# Patient Record
Sex: FEMALE | Race: BLACK OR AFRICAN AMERICAN | Employment: FULL TIME | URBAN - NONMETROPOLITAN AREA
[De-identification: names, ages, dates, MRNs, and addresses within clinical notes are randomized per-mention and may not be internally consistent; named-entity substitution may affect disease eponyms.]

---

## 2018-03-20 ENCOUNTER — APPOINTMENT (OUTPATIENT)
Dept: OCCUPATIONAL MEDICINE | Facility: OTHER | Age: 33
End: 2018-03-20

## 2018-03-20 PROCEDURE — 99000 SPECIMEN HANDLING OFFICE-LAB: CPT

## 2018-11-27 NOTE — PROGRESS NOTES
SUBJECTIVE:   Yodit Bronson is a 33 year old female who presents to clinic today for the following health issues:    New Patient/Transfer of Care.    From [place] - had very limited medical care there.  Was living in [place] -  is there.  Residing in [place] now.   Work for [employer].    Conceiving Issue    Duration: Try to conceive since 2016    Description (location/character/radiation): Not on birth control, never has been on.     Intensity:  N/A    Accompanying signs and symptoms: None     History (similar episodes/previous evaluation): No previous pregnancies     Precipitating or alleviating factors: None    Therapies tried and outcome: Tracking menstrual cycle. LMP 11/25/18.     Does have hx of bad stomach cramps when on cycle - uses acetaminophen 500mg which helps.       has children from prior relationship.    Menarche as teen.    Regular menses.  Monthly, less than 1 week, 2nd day heavy.  No OTC ovulation kits.    7 siblings.  Sibling also have children.  1 sister passed - kidney and lung issues - age 42.    Dad with HTN.    Problem list and histories reviewed & adjusted, as indicated.  Additional history: as documented    Current Outpatient Prescriptions   Medication     ACETAMINOPHEN PO     No current facility-administered medications for this visit.        There is no problem list on file for this patient.    History reviewed. No pertinent surgical history.    Social History   Substance Use Topics     Smoking status: Never Smoker     Smokeless tobacco: Never Used     Alcohol use No     Family History   Problem Relation Age of Onset     KIDNEY DISEASE Sister            Reviewed and updated as needed this visit by clinical staff  Tobacco  Allergies  Meds  Med Hx  Surg Hx  Fam Hx  Soc Hx      Reviewed and updated as needed this visit by Provider         ROS:  Constitutional, HEENT, cardiovascular, pulmonary, gi and gu systems are negative, except as otherwise noted.    OBJECTIVE:      /68 (BP Location: Left arm, Patient Position: Chair, Cuff Size: Adult Regular)  Pulse 89  Temp 97.7  F (36.5  C) (Tympanic)  Wt 159 lb 9.6 oz (72.4 kg)  SpO2 99%  There is no height or weight on file to calculate BMI.  GENERAL: healthy, alert and no distress  NECK: no adenopathy, no asymmetry, masses, or scars and thyroid normal to palpation  RESP: lungs clear to auscultation - no rales, rhonchi or wheezes  CV: regular rate and rhythm, normal S1 S2, no S3 or S4, no murmur, click or rub, no peripheral edema and peripheral pulses strong  ABDOMEN: soft, nontender, no hepatosplenomegaly, no masses and bowel sounds normal  MS: no gross musculoskeletal defects noted, no edema  PSYCH: mentation appears normal, affect normal/bright    Diagnostic Test Results:  none     ASSESSMENT/PLAN:   (N97.9) Female fertility problems  (primary encounter diagnosis)  Comment: actively trying for years;  has children from prior relationship; referral to GYN for fertility evaluation  Plan: OB/GYN REFERRAL          Patient Instructions   Flu shot given.  Referral to OB/GYN for fertility consultation.              Argenis Andrews MD  [facility] [place]

## 2018-11-30 ENCOUNTER — OFFICE VISIT (OUTPATIENT)
Dept: FAMILY MEDICINE | Facility: OTHER | Age: 33
End: 2018-11-30
Attending: FAMILY MEDICINE
Payer: COMMERCIAL

## 2018-11-30 VITALS
TEMPERATURE: 97.7 F | WEIGHT: 159.6 LBS | HEART RATE: 89 BPM | OXYGEN SATURATION: 99 % | DIASTOLIC BLOOD PRESSURE: 68 MMHG | SYSTOLIC BLOOD PRESSURE: 118 MMHG

## 2018-11-30 DIAGNOSIS — N97.9 FEMALE FERTILITY PROBLEMS: Primary | ICD-10-CM

## 2018-11-30 DIAGNOSIS — Z23 NEED FOR PROPHYLACTIC VACCINATION AND INOCULATION AGAINST INFLUENZA: ICD-10-CM

## 2018-11-30 PROCEDURE — 90471 IMMUNIZATION ADMIN: CPT | Performed by: FAMILY MEDICINE

## 2018-11-30 PROCEDURE — 99203 OFFICE O/P NEW LOW 30 MIN: CPT | Mod: 25 | Performed by: FAMILY MEDICINE

## 2018-11-30 PROCEDURE — 90686 IIV4 VACC NO PRSV 0.5 ML IM: CPT | Performed by: FAMILY MEDICINE

## 2018-11-30 ASSESSMENT — ANXIETY QUESTIONNAIRES
4. TROUBLE RELAXING: NOT AT ALL
2. NOT BEING ABLE TO STOP OR CONTROL WORRYING: NOT AT ALL
6. BECOMING EASILY ANNOYED OR IRRITABLE: NOT AT ALL
3. WORRYING TOO MUCH ABOUT DIFFERENT THINGS: NOT AT ALL
1. FEELING NERVOUS, ANXIOUS, OR ON EDGE: NOT AT ALL
7. FEELING AFRAID AS IF SOMETHING AWFUL MIGHT HAPPEN: NOT AT ALL
GAD7 TOTAL SCORE: 0
5. BEING SO RESTLESS THAT IT IS HARD TO SIT STILL: NOT AT ALL

## 2018-11-30 ASSESSMENT — PAIN SCALES - GENERAL: PAINLEVEL: NO PAIN (0)

## 2018-11-30 ASSESSMENT — PATIENT HEALTH QUESTIONNAIRE - PHQ9: SUM OF ALL RESPONSES TO PHQ QUESTIONS 1-9: 0

## 2018-11-30 NOTE — MR AVS SNAPSHOT
After Visit Summary   11/30/2018    Yodit Bronson    MRN: [id number]           Patient Information     Date Of Birth          1985        Visit Information        Provider Department      11/30/2018 1:45 PM Argenis Clemente MD [facility]        Today's Diagnoses     Female fertility problems    -  1      Care Instructions    Flu shot given.  Referral to OB/GYN for fertility consultation.            Follow-ups after your visit        Additional Services     OB/GYN REFERRAL       Your provider has referred you to:  [facility] [phone]  [email or web address]    Please be aware that coverage of these services is subject to the terms and limitations of your health insurance plan.  Call member services at your health plan with any benefit or coverage questions.      Please bring the following with you to your appointment:    (1) Any X-Rays, CTs or MRIs which have been performed.  Contact the facility where they were done to arrange for  prior to your scheduled appointment.   (2) List of current medications   (3) This referral request   (4) Any documents/labs given to you for this referral                  Who to contact     If you have questions or need follow up information about today's clinic visit or your schedule please contact [facility] directly at [phone].  Normal or non-critical lab and imaging results will be communicated to you by MyChart, letter or phone within 4 business days after the clinic has received the results. If you do not hear from us within 7 days, please contact the clinic through MyChart or phone. If you have a critical or abnormal lab result, we will notify you by phone as soon as possible.  Submit refill requests through [employer] or call your pharmacy and they will forward the refill request to us. Please allow 3 business days for your refill to be  "completed.          Additional Information About Your Visit        ZENTICKETharVidBid Information     [employer] lets you send messages to your doctor, view your test results, renew your prescriptions, schedule appointments and more. To sign up, go to [email or web address] . Click on \"Log in\" on the left side of the screen, which will take you to the Welcome page. Then click on \"Sign up Now\" on the right side of the page.     You will be asked to enter the access code listed below, as well as some personal information. Please follow the directions to create your username and password.     Your access code is: [email or web address]  Expires: 2019  2:05 PM     Your access code will  in 90 days. If you need help or a new code, please call your [place] clinic or [phone].        Care EveryWhere ID     This is your Care EveryWhere ID. This could be used by other organizations to access your [place] medical records  [id number]        Your Vitals Were     Pulse Temperature Pulse Oximetry             89 97.7  F (36.5  C) (Tympanic) 99%          Blood Pressure from Last 3 Encounters:   18 118/68    Weight from Last 3 Encounters:   18 159 lb 9.6 oz (72.4 kg)              We Performed the Following     OB/GYN REFERRAL        Primary Care Provider    None Specified       No primary provider on file.        Equal Access to Services     [facility]: Hadii aad ku hadasho Soomaali, waaxda luqadaha, qaybta kaalmada adeegyada, aram weldon . So [facility] [phone].    ATENCIÓN: Si habla español, tiene a he disposición servicios gratuitos de asistencia lingüística. Llame al [phone].    We comply with applicable federal civil rights laws and [place] laws. We do not discriminate on the basis of race, color, national origin, age, disability, sex, sexual orientation, or gender identity.            Thank you!     Thank you for choosing [facility]  for your care. Our goal " is always to provide you with excellent care. Hearing back from our patients is one way we can continue to improve our services. Please take a few minutes to complete the written survey that you may receive in the mail after your visit with us. Thank you!             Your Updated Medication List - Protect others around you: Learn how to safely use, store and throw away your medicines at www.disposemymeds.org.          This list is accurate as of 11/30/18  2:05 PM.  Always use your most recent med list.                   Brand Name Dispense Instructions for use Diagnosis    ACETAMINOPHEN PO      Take 500 mg by mouth daily as needed for pain

## 2018-11-30 NOTE — NURSING NOTE
Chief Complaint   Patient presents with     Establish Care       Initial /68 (BP Location: Left arm, Patient Position: Chair, Cuff Size: Adult Regular)  Pulse 89  Temp 97.7  F (36.5  C) (Tympanic)  Wt 159 lb 9.6 oz (72.4 kg)  SpO2 99% There is no height or weight on file to calculate BMI.  Medication Reconciliation: complete    Taylor Sandoval, [place]

## 2018-11-30 NOTE — PROGRESS NOTES
Injectable Influenza Immunization Documentation    1.  Is the person to be vaccinated sick today?   No    2. Does the person to be vaccinated have an allergy to a component   of the vaccine?   No  Egg Allergy Algorithm Link    3. Has the person to be vaccinated ever had a serious reaction   to influenza vaccine in the past?   No    4. Has the person to be vaccinated ever had Guillain-Barré syndrome?   No    Form completed by Taylor Sandoval LPN    Prior to injection verified patient identity using patient's name and date of birth.    Taylor Sandoval LPN

## 2018-12-01 ASSESSMENT — ANXIETY QUESTIONNAIRES: GAD7 TOTAL SCORE: 0

## 2018-12-07 ENCOUNTER — OFFICE VISIT (OUTPATIENT)
Dept: OBGYN | Facility: OTHER | Age: 33
End: 2018-12-07
Attending: FAMILY MEDICINE
Payer: COMMERCIAL

## 2018-12-07 VITALS
WEIGHT: 162 LBS | HEART RATE: 88 BPM | HEIGHT: 66 IN | DIASTOLIC BLOOD PRESSURE: 62 MMHG | BODY MASS INDEX: 26.03 KG/M2 | SYSTOLIC BLOOD PRESSURE: 100 MMHG

## 2018-12-07 DIAGNOSIS — N97.9 FEMALE FERTILITY PROBLEMS: ICD-10-CM

## 2018-12-07 LAB
T4 FREE SERPL-MCNC: 0.9 NG/DL (ref 0.76–1.46)
TSH SERPL DL<=0.005 MIU/L-ACNC: 0.37 MU/L (ref 0.4–4)

## 2018-12-07 PROCEDURE — 84443 ASSAY THYROID STIM HORMONE: CPT | Performed by: OBSTETRICS & GYNECOLOGY

## 2018-12-07 PROCEDURE — 83001 ASSAY OF GONADOTROPIN (FSH): CPT | Performed by: OBSTETRICS & GYNECOLOGY

## 2018-12-07 PROCEDURE — 2894A VOIDCORRECT: CPT | Performed by: OBSTETRICS & GYNECOLOGY

## 2018-12-07 PROCEDURE — 84439 ASSAY OF FREE THYROXINE: CPT | Performed by: OBSTETRICS & GYNECOLOGY

## 2018-12-07 PROCEDURE — 99213 OFFICE O/P EST LOW 20 MIN: CPT | Performed by: OBSTETRICS & GYNECOLOGY

## 2018-12-07 PROCEDURE — 36415 COLL VENOUS BLD VENIPUNCTURE: CPT | Performed by: OBSTETRICS & GYNECOLOGY

## 2018-12-07 ASSESSMENT — PAIN SCALES - GENERAL: PAINLEVEL: NO PAIN (0)

## 2018-12-07 NOTE — NURSING NOTE
"Chief Complaint   Patient presents with     Consult     Consult from Dr Clemente for infertility       Initial /62 (BP Location: Left arm, Patient Position: Sitting, Cuff Size: Adult Regular)  Pulse 88  Ht 5' 6\" (1.676 m)  Wt 162 lb (73.5 kg)  LMP 11/30/2018 (Exact Date)  Breastfeeding? No  BMI 26.15 kg/m2 Estimated body mass index is 26.15 kg/(m^2) as calculated from the following:    Height as of this encounter: 5' 6\" (1.676 m).    Weight as of this encounter: 162 lb (73.5 kg).  Medication Reconciliation: complete    MADHAV PRICE LPN    "

## 2018-12-07 NOTE — PROGRESS NOTES
"Yodit Bronson is a 33 year old  who desires pregnancy. She has been trying since her marriage 2 years ago, unfortunately due to her work she and her  are together only 1-2 days per month. She states that her periods are very regular at 28-30 days. Her  has a 5yo. Through a previous marriage. She denies any h/o pelvic infections/STD's. They have not timed intercourse    Past Medical History:   Diagnosis Date     NO ACTIVE PROBLEMS      Past Surgical History:   Procedure Laterality Date     NO HISTORY OF SURGERY        Not on File    EXAM  /62 (BP Location: Left arm, Patient Position: Sitting, Cuff Size: Adult Regular)  Pulse 88  Ht 5' 6\" (1.676 m)  Wt 162 lb (73.5 kg)  LMP 2018 (Exact Date)  Breastfeeding? No  BMI 26.15 kg/m2  Gen - NAD, well nourished  Extremities - no edema      A/P Desire for pregnancy     1. Discussed timing intercourse and pt was given ovulation calendar.   2. Discussed possible use of an ovulation test kit.    3. Pt would seem to be ovulatory due to regularity of menses but will check an FSH and TSH   4. HSG may be of value since eggs and sperm would seem to be present based on history. Pt will consider and f/u PRN    ELY CHRISTENSEN MD  This visit included 20 minutes of face to face consultation on the above topics  "

## 2018-12-07 NOTE — MR AVS SNAPSHOT
"              After Visit Summary   2018    Yodit Bronson    MRN: [id number]           Patient Information     Date Of Birth          1985        Visit Information        Provider Department      2018 1:30 PM Ryder Armstrong MD [facility]        Today's Diagnoses     Female fertility problems           Follow-ups after your visit        Who to contact     If you have questions or need follow up information about today's clinic visit or your schedule please contact [facility] directly at [phone].  Normal or non-critical lab and imaging results will be communicated to you by [employer], letter or phone within 4 business days after the clinic has received the results. If you do not hear from us within 7 days, please contact the clinic through [employer] or phone. If you have a critical or abnormal lab result, we will notify you by phone as soon as possible.  Submit refill requests through [employer] or call your pharmacy and they will forward the refill request to us. Please allow 3 business days for your refill to be completed.          Additional Information About Your Visit        MyChart Information     [employer] lets you send messages to your doctor, view your test results, renew your prescriptions, schedule appointments and more. To sign up, go to [email or web address] . Click on \"Log in\" on the left side of the screen, which will take you to the Welcome page. Then click on \"Sign up Now\" on the right side of the page.     You will be asked to enter the access code listed below, as well as some personal information. Please follow the directions to create your username and password.     Your access code is: [email or web address]  Expires: 2019  2:05 PM     Your access code will  in 90 days. If you need help or a new code, please call your [facility] or [phone].        Care EveryWhere ID     This is your Care EveryWhere ID. This could " "be used by other organizations to access your [place] medical records  [id number]        Your Vitals Were     Pulse Height Last Period Breastfeeding? BMI (Body Mass Index)       88 5' 6\" (1.676 m) 11/30/2018 (Exact Date) No 26.15 kg/m2        Blood Pressure from Last 3 Encounters:   12/07/18 100/62   11/30/18 118/68    Weight from Last 3 Encounters:   12/07/18 162 lb (73.5 kg)   11/30/18 159 lb 9.6 oz (72.4 kg)              We Performed the Following     Follicle stimulating hormone     T4 free     T4 free     TSH with free T4 reflex        Primary Care Provider Office Phone # Fax #    Argenis Clemente [phone] [address] [place] [place] [address]        Equal Access to Services     [facility] [place] : Hadii conrado capps hadasho Sojose l, waaxda luqadaha, qaybta kaalmada adeegyada, aram weldon . So [facility] [phone].    ATENCIÓN: Si habla español, tiene a he disposición servicios gratuitos de asistencia lingüística. Llame al [phone].    We comply with applicable federal civil rights laws and [place] laws. We do not discriminate on the basis of race, color, national origin, age, disability, sex, sexual orientation, or gender identity.            Thank you!     Thank you for choosing [facility]  for your care. Our goal is always to provide you with excellent care. Hearing back from our patients is one way we can continue to improve our services. Please take a few minutes to complete the written survey that you may receive in the mail after your visit with us. Thank you!             Your Updated Medication List - Protect others around you: Learn how to safely use, store and throw away your medicines at www.disposemymeds.org.          This list is accurate as of 12/7/18  2:56 PM.  Always use your most recent med list.                   Brand Name Dispense Instructions for use Diagnosis    ACETAMINOPHEN PO      Take 500 mg by mouth daily as " needed for pain

## 2018-12-10 LAB — FSH SERPL-ACNC: 9.4 IU/L

## 2019-02-04 NOTE — PROGRESS NOTES
SUBJECTIVE:   Yodit Bronson is a 33 year old female who presents to clinic today for the following health issues:    Vaginal Bleeding (Dysmenorrhea)      Onset: January 6th, 2019 - first noticed after intercourse.     Description:  Duration of bleeding episodes: Ongoing since 1/6/19 - first time this had ever occurred   Frequency between periods:  30 days   Describe bleeding/flow:   Clots: no  Number of pads/hour: 1 per day   Cramping: mild - once     Intensity:  mild    Accompanying signs and symptoms: Ongoing bleeding since intercourse on 1/6/19 - has been wearing pad since as has been bleeding. Varies from excessive blood to just spotting.    History (similar episodes/previous evaluation): None    Precipitating or alleviating factors: None    Therapies tried and outcome: None    G0    Did see OBGYN 12/2018 for fertility issues    No pain or cramping    No discharge or itching    LMP 1/14/19 - heavy    Unsure of last pap          Problem list and histories reviewed & adjusted, as indicated.  Additional history: as documented    Current Outpatient Medications   Medication     ACETAMINOPHEN PO     No current facility-administered medications for this visit.        There is no problem list on file for this patient.    Past Surgical History:   Procedure Laterality Date     NO HISTORY OF SURGERY         Social History     Tobacco Use     Smoking status: Never Smoker     Smokeless tobacco: Never Used   Substance Use Topics     Alcohol use: No     Family History   Problem Relation Age of Onset     Kidney Disease Sister            Reviewed and updated as needed this visit by clinical staff       Reviewed and updated as needed this visit by Provider         ROS:  CONSTITUTIONAL:NEGATIVE for fever, chills, change in weight  INTEGUMENTARY/SKIN: NEGATIVE for worrisome rashes  RESP:NEGATIVE for significant cough or SOB  CV: NEGATIVE for chest pain, palpitations or peripheral edema  GI: NEGATIVE for nausea, abdominal  pain, heartburn, or change in bowel habits  : as above    OBJECTIVE:     /68 (BP Location: Right arm, Patient Position: Chair, Cuff Size: Adult Regular)   Pulse 89   Temp 97.7  F (36.5  C) (Tympanic)   Wt 75.3 kg (166 lb)   SpO2 100%   BMI 26.79 kg/m    Body mass index is 26.79 kg/m .  GENERAL: healthy, alert and no distress  NECK: no adenopathy, no asymmetry, masses, or scars and thyroid normal to palpation  RESP: lungs clear to auscultation - no rales, rhonchi or wheezes  CV: regular rate and rhythm, normal S1 S2, no S3 or S4, no murmur, click or rub, no peripheral edema and peripheral pulses strong  ABDOMEN: soft, nontender, no hepatosplenomegaly, no masses and bowel sounds normal   (female): normal female external genitalia, normal urethral meatus , vaginal mucosa pink, moist, well rugated and cervix with large polypoid, friable tissue protruding from cervix  MS: no gross musculoskeletal defects noted, no edema  PSYCH: mentation appears normal, affect normal/bright    Diagnostic Test Results:  pending    ASSESSMENT/PLAN:   (N92.1) Menorrhagia with irregular cycle  (primary encounter diagnosis)  Plan: TSH with free T4 reflex, CBC with platelets and        differential, HCG Qual, Urine - CSC,  [place],         [place]  ([id number]), US Pelvic Complete with         Transvaginal, A pap thin layer diagnostic with         HPV (select HPV order below), HPV High Risk         Types DNA Cervical, OB/GYN REFERRAL, Wet prep      (R79.89) Abnormal TSH  Comment: suppressed, with normal T4 on last check  Plan: TSH with free T4 reflex            (N84.1) Cervical polyp  Comment: polyp vs other tissue?  Plan: A pap thin layer diagnostic with HPV (select         HPV order below), HPV High Risk Types DNA         Cervical, OB/GYN REFERRAL            (N93.0) Postcoital bleeding  Plan: A pap thin layer diagnostic with HPV (select         HPV order below), HPV High Risk Types DNA         Cervical, OB/GYN REFERRAL, Wet  prep    Labs today - thyroid, bacteria/yeast, blood counts.  Will call with results.  Pap smear for cervical cancer screening done as well.  Pelvic ultrasound and gynecology referral placed.  They will call to schedule.  Continue to wear pad/liner.  Avoid intercourse if causing further pain/bleeding.      Argenis Andrews MD  [facility] - [place]

## 2019-02-06 ENCOUNTER — OFFICE VISIT (OUTPATIENT)
Dept: FAMILY MEDICINE | Facility: OTHER | Age: 34
End: 2019-02-06
Attending: FAMILY MEDICINE
Payer: COMMERCIAL

## 2019-02-06 VITALS
OXYGEN SATURATION: 100 % | TEMPERATURE: 97.7 F | HEART RATE: 89 BPM | SYSTOLIC BLOOD PRESSURE: 110 MMHG | DIASTOLIC BLOOD PRESSURE: 68 MMHG | BODY MASS INDEX: 26.79 KG/M2 | WEIGHT: 166 LBS

## 2019-02-06 DIAGNOSIS — N92.1 MENORRHAGIA WITH IRREGULAR CYCLE: Primary | ICD-10-CM

## 2019-02-06 DIAGNOSIS — R79.89 ABNORMAL TSH: ICD-10-CM

## 2019-02-06 DIAGNOSIS — N84.1 CERVICAL POLYP: ICD-10-CM

## 2019-02-06 DIAGNOSIS — N76.0 BV (BACTERIAL VAGINOSIS): ICD-10-CM

## 2019-02-06 DIAGNOSIS — N93.0 POSTCOITAL BLEEDING: ICD-10-CM

## 2019-02-06 DIAGNOSIS — B96.89 BV (BACTERIAL VAGINOSIS): ICD-10-CM

## 2019-02-06 LAB
BASOPHILS # BLD AUTO: 0 10E9/L (ref 0–0.2)
BASOPHILS NFR BLD AUTO: 0.2 %
DIFFERENTIAL METHOD BLD: NORMAL
EOSINOPHIL # BLD AUTO: 0.1 10E9/L (ref 0–0.7)
EOSINOPHIL NFR BLD AUTO: 0.8 %
ERYTHROCYTE [DISTWIDTH] IN BLOOD BY AUTOMATED COUNT: 13.3 % (ref 10–15)
HCG UR QL: NEGATIVE
HCT VFR BLD AUTO: 36.9 % (ref 35–47)
HGB BLD-MCNC: 12.5 G/DL (ref 11.7–15.7)
IMM GRANULOCYTES # BLD: 0 10E9/L (ref 0–0.4)
IMM GRANULOCYTES NFR BLD: 0.2 %
LYMPHOCYTES # BLD AUTO: 2.1 10E9/L (ref 0.8–5.3)
LYMPHOCYTES NFR BLD AUTO: 35.4 %
MCH RBC QN AUTO: 32.5 PG (ref 26.5–33)
MCHC RBC AUTO-ENTMCNC: 33.9 G/DL (ref 31.5–36.5)
MCV RBC AUTO: 96 FL (ref 78–100)
MONOCYTES # BLD AUTO: 0.3 10E9/L (ref 0–1.3)
MONOCYTES NFR BLD AUTO: 5.6 %
NEUTROPHILS # BLD AUTO: 3.5 10E9/L (ref 1.6–8.3)
NEUTROPHILS NFR BLD AUTO: 57.8 %
NRBC # BLD AUTO: 0 10*3/UL
NRBC BLD AUTO-RTO: 0 /100
PLATELET # BLD AUTO: 281 10E9/L (ref 150–450)
RBC # BLD AUTO: 3.85 10E12/L (ref 3.8–5.2)
SPECIMEN SOURCE: ABNORMAL
TSH SERPL DL<=0.005 MIU/L-ACNC: 0.44 MU/L (ref 0.4–4)
WBC # BLD AUTO: 6 10E9/L (ref 4–11)
WET PREP SPEC: ABNORMAL

## 2019-02-06 PROCEDURE — 85025 COMPLETE CBC W/AUTO DIFF WBC: CPT | Performed by: FAMILY MEDICINE

## 2019-02-06 PROCEDURE — 88142 CYTOPATH C/V THIN LAYER: CPT | Performed by: FAMILY MEDICINE

## 2019-02-06 PROCEDURE — 99214 OFFICE O/P EST MOD 30 MIN: CPT | Performed by: FAMILY MEDICINE

## 2019-02-06 PROCEDURE — 87210 SMEAR WET MOUNT SALINE/INK: CPT | Performed by: FAMILY MEDICINE

## 2019-02-06 PROCEDURE — 36415 COLL VENOUS BLD VENIPUNCTURE: CPT | Performed by: FAMILY MEDICINE

## 2019-02-06 PROCEDURE — 84443 ASSAY THYROID STIM HORMONE: CPT | Performed by: FAMILY MEDICINE

## 2019-02-06 PROCEDURE — 81025 URINE PREGNANCY TEST: CPT | Performed by: FAMILY MEDICINE

## 2019-02-06 PROCEDURE — 87624 HPV HI-RISK TYP POOLED RSLT: CPT | Mod: 90 | Performed by: FAMILY MEDICINE

## 2019-02-06 PROCEDURE — 99000 SPECIMEN HANDLING OFFICE-LAB: CPT | Performed by: FAMILY MEDICINE

## 2019-02-06 RX ORDER — METRONIDAZOLE 500 MG/1
500 TABLET ORAL 2 TIMES DAILY
Qty: 14 TABLET | Refills: 0 | Status: SHIPPED | OUTPATIENT
Start: 2019-02-06 | End: 2019-02-13

## 2019-02-06 ASSESSMENT — PAIN SCALES - GENERAL: PAINLEVEL: NO PAIN (0)

## 2019-02-06 NOTE — NURSING NOTE
"Chief Complaint   Patient presents with     Vaginal Bleeding       Initial /68 (BP Location: Right arm, Patient Position: Chair, Cuff Size: Adult Regular)   Pulse 89   Temp 97.7  F (36.5  C) (Tympanic)   Wt 75.3 kg (166 lb)   SpO2 100%   BMI 26.79 kg/m   Estimated body mass index is 26.79 kg/m  as calculated from the following:    Height as of 12/7/18: 1.676 m (5' 6\").    Weight as of this encounter: 75.3 kg (166 lb).  Medication Reconciliation: complete    Taylor Sandoval LPN    "

## 2019-02-06 NOTE — PATIENT INSTRUCTIONS
Labs today - thyroid, bacteria/yeast, blood counts.  Will call with results.  Pap smear for cervical cancer screening done as well.  Pelvic ultrasound and gynecology referral placed.  They will call to schedule.  Continue to wear pad/liner.  Avoid intercourse if causing further pain/bleeding.

## 2019-02-11 LAB
COPATH REPORT: NORMAL
PAP: NORMAL

## 2019-02-12 LAB
FINAL DIAGNOSIS: NORMAL
HPV HR 12 DNA CVX QL NAA+PROBE: NEGATIVE
HPV16 DNA SPEC QL NAA+PROBE: NEGATIVE
HPV18 DNA SPEC QL NAA+PROBE: NEGATIVE
SPECIMEN DESCRIPTION: NORMAL
SPECIMEN SOURCE CVX/VAG CYTO: NORMAL